# Patient Record
Sex: FEMALE | Race: OTHER | Employment: FULL TIME | URBAN - METROPOLITAN AREA
[De-identification: names, ages, dates, MRNs, and addresses within clinical notes are randomized per-mention and may not be internally consistent; named-entity substitution may affect disease eponyms.]

---

## 2017-10-02 ENCOUNTER — ANESTHESIA EVENT (OUTPATIENT)
Dept: ENDOSCOPY | Facility: HOSPITAL | Age: 37
End: 2017-10-02
Payer: COMMERCIAL

## 2017-10-02 ENCOUNTER — SURGERY (OUTPATIENT)
Age: 37
End: 2017-10-02

## 2017-10-02 ENCOUNTER — HOSPITAL ENCOUNTER (OUTPATIENT)
Facility: HOSPITAL | Age: 37
Setting detail: HOSPITAL OUTPATIENT SURGERY
Discharge: HOME | End: 2017-10-02
Attending: INTERNAL MEDICINE | Admitting: INTERNAL MEDICINE
Payer: COMMERCIAL

## 2017-10-02 ENCOUNTER — ANESTHESIA (OUTPATIENT)
Dept: ENDOSCOPY | Facility: HOSPITAL | Age: 37
End: 2017-10-02
Payer: COMMERCIAL

## 2017-10-02 VITALS
OXYGEN SATURATION: 94 % | HEIGHT: 65 IN | BODY MASS INDEX: 48.82 KG/M2 | TEMPERATURE: 98 F | DIASTOLIC BLOOD PRESSURE: 79 MMHG | RESPIRATION RATE: 16 BRPM | HEART RATE: 80 BPM | SYSTOLIC BLOOD PRESSURE: 118 MMHG | WEIGHT: 293 LBS

## 2017-10-02 DIAGNOSIS — Z01.818 PREOP EXAMINATION: ICD-10-CM

## 2017-10-02 DIAGNOSIS — D64.9 ANEMIA, UNSPECIFIED TYPE: ICD-10-CM

## 2017-10-02 DIAGNOSIS — E66.09 OBESITY DUE TO EXCESS CALORIES WITHOUT SERIOUS COMORBIDITY WITH BODY MASS INDEX (BMI) IN 98TH TO 99TH PERCENTILE FOR AGE IN PEDIATRIC PATIENT: ICD-10-CM

## 2017-10-02 PROCEDURE — 88305 TISSUE EXAM BY PATHOLOGIST: CPT | Performed by: INTERNAL MEDICINE

## 2017-10-02 PROCEDURE — 81025 URINE PREGNANCY TEST: CPT | Performed by: INTERNAL MEDICINE

## 2017-10-02 PROCEDURE — 0DB68ZX EXCISION OF STOMACH, VIA NATURAL OR ARTIFICIAL OPENING ENDOSCOPIC, DIAGNOSTIC: ICD-10-PCS | Performed by: INTERNAL MEDICINE

## 2017-10-02 PROCEDURE — 0DBN8ZX EXCISION OF SIGMOID COLON, VIA NATURAL OR ARTIFICIAL OPENING ENDOSCOPIC, DIAGNOSTIC: ICD-10-PCS | Performed by: INTERNAL MEDICINE

## 2017-10-02 PROCEDURE — 0DB98ZX EXCISION OF DUODENUM, VIA NATURAL OR ARTIFICIAL OPENING ENDOSCOPIC, DIAGNOSTIC: ICD-10-PCS | Performed by: INTERNAL MEDICINE

## 2017-10-02 RX ORDER — SODIUM CHLORIDE, SODIUM LACTATE, POTASSIUM CHLORIDE, CALCIUM CHLORIDE 600; 310; 30; 20 MG/100ML; MG/100ML; MG/100ML; MG/100ML
INJECTION, SOLUTION INTRAVENOUS CONTINUOUS
Status: DISCONTINUED | OUTPATIENT
Start: 2017-10-02 | End: 2017-10-02

## 2017-10-02 NOTE — ANESTHESIA POSTPROCEDURE EVALUATION
[facility] Patient Status:  Hospital Outpatient Surgery   Age/Gender 40year old female MRN [id number]   Location [address] [place] [address]. Attending Wm Parra MD   Hosp Day # 0 PCP Baltazar Garcia MD       Anesthesia Post-op

## 2017-10-02 NOTE — OPERATIVE REPORT
PATIENT NAME: Josette Parekh  MRN: [id number]  DATE OF OPERATION: 10/2/2017  PREOPERATIVE DIAGNOSIS:   1. Iron deficiency anemia  2. pre op evaluation for gastric bypass for morbid obesity  POSTOPERATIVE DIAGNOSES:  1. Normal upper endoscopy  2.  Hemorrhoids from the duodenum, and stomach.          The patient was then rotated 180 degrees, and a digital rectal exam was performed which revealed no obvious perianal disease or palpable masses within the anal canal. The lubricated tip of an Olympus video colonoscop

## 2017-10-02 NOTE — ANESTHESIA PREPROCEDURE EVALUATION
PRE-OP EVALUATION    Patient Name: Ross Cardenas    Pre-op Diagnosis: Preop examination [Z01.818]  Anemia, unspecified type [D64.9]  Obesity due to excess calories without serious comorbidity with body mass index (BMI) in 98th to 99th percentile for age i notable dental history. Pulmonary                     Other findings            ASA: 2   Plan: MAC  NPO status verified and     Post-procedure pain management plan discussed with surgeon and patient.       Plan/risks discussed with: patient

## 2017-10-05 NOTE — PROGRESS NOTES
10/5/2017  [address]    Dear Alejandra Manley,       Here are the biopsy/pathology findings from your recent EGD (upper endoscopy) and colonoscopy: The biopsy/pathology findings from your upper endoscopy showed:  1.   Small b

## 2017-11-30 PROCEDURE — 88175 CYTOPATH C/V AUTO FLUID REDO: CPT | Performed by: OBSTETRICS & GYNECOLOGY

## 2017-11-30 PROCEDURE — 87624 HPV HI-RISK TYP POOLED RSLT: CPT | Performed by: OBSTETRICS & GYNECOLOGY

## 2018-12-03 PROCEDURE — 88175 CYTOPATH C/V AUTO FLUID REDO: CPT | Performed by: OBSTETRICS & GYNECOLOGY

## 2019-09-12 PROBLEM — R10.32 LEFT LOWER QUADRANT ABDOMINAL PAIN: Status: ACTIVE | Noted: 2019-09-12

## 2019-09-12 PROBLEM — K52.9 COLITIS: Status: ACTIVE | Noted: 2019-09-12

## 2019-09-12 PROBLEM — R93.3 ABNORMAL FINDING ON GI TRACT IMAGING: Status: ACTIVE | Noted: 2019-09-12

## 2019-12-04 PROBLEM — N63.15 BREAST LUMP ON RIGHT SIDE AT 3 O'CLOCK POSITION: Status: ACTIVE | Noted: 2019-12-04

## 2019-12-04 PROBLEM — N63.15 BREAST LUMP ON RIGHT SIDE AT 9 O'CLOCK POSITION: Status: ACTIVE | Noted: 2019-12-04

## 2021-11-09 ENCOUNTER — LAB ENCOUNTER (OUTPATIENT)
Dept: LAB | Age: 41
End: 2021-11-09
Attending: INTERNAL MEDICINE
Payer: COMMERCIAL

## 2021-11-09 DIAGNOSIS — Z01.818 PRE-OP TESTING: ICD-10-CM

## 2021-11-12 ENCOUNTER — ANESTHESIA (OUTPATIENT)
Dept: ENDOSCOPY | Facility: HOSPITAL | Age: 41
End: 2021-11-12
Payer: COMMERCIAL

## 2021-11-12 ENCOUNTER — HOSPITAL ENCOUNTER (OUTPATIENT)
Facility: HOSPITAL | Age: 41
Setting detail: HOSPITAL OUTPATIENT SURGERY
Discharge: HOME | End: 2021-11-12
Attending: INTERNAL MEDICINE | Admitting: INTERNAL MEDICINE
Payer: COMMERCIAL

## 2021-11-12 ENCOUNTER — ANESTHESIA EVENT (OUTPATIENT)
Dept: ENDOSCOPY | Facility: HOSPITAL | Age: 41
End: 2021-11-12
Payer: COMMERCIAL

## 2021-11-12 VITALS
TEMPERATURE: 98 F | RESPIRATION RATE: 16 BRPM | DIASTOLIC BLOOD PRESSURE: 57 MMHG | SYSTOLIC BLOOD PRESSURE: 114 MMHG | WEIGHT: 293 LBS | HEIGHT: 65 IN | HEART RATE: 66 BPM | OXYGEN SATURATION: 99 % | BODY MASS INDEX: 48.82 KG/M2

## 2021-11-12 DIAGNOSIS — D50.9 MICROCYTIC ANEMIA: ICD-10-CM

## 2021-11-12 DIAGNOSIS — K59.04 CHRONIC IDIOPATHIC CONSTIPATION: ICD-10-CM

## 2021-11-12 DIAGNOSIS — Z01.818 PRE-OP TESTING: Primary | ICD-10-CM

## 2021-11-12 PROCEDURE — 81025 URINE PREGNANCY TEST: CPT

## 2021-11-12 PROCEDURE — 0DBA8ZX EXCISION OF JEJUNUM, VIA NATURAL OR ARTIFICIAL OPENING ENDOSCOPIC, DIAGNOSTIC: ICD-10-PCS | Performed by: INTERNAL MEDICINE

## 2021-11-12 PROCEDURE — 0DBN8ZX EXCISION OF SIGMOID COLON, VIA NATURAL OR ARTIFICIAL OPENING ENDOSCOPIC, DIAGNOSTIC: ICD-10-PCS | Performed by: INTERNAL MEDICINE

## 2021-11-12 PROCEDURE — 0DB68ZX EXCISION OF STOMACH, VIA NATURAL OR ARTIFICIAL OPENING ENDOSCOPIC, DIAGNOSTIC: ICD-10-PCS | Performed by: INTERNAL MEDICINE

## 2021-11-12 PROCEDURE — 0DBL8ZX EXCISION OF TRANSVERSE COLON, VIA NATURAL OR ARTIFICIAL OPENING ENDOSCOPIC, DIAGNOSTIC: ICD-10-PCS | Performed by: INTERNAL MEDICINE

## 2021-11-12 PROCEDURE — 88305 TISSUE EXAM BY PATHOLOGIST: CPT | Performed by: INTERNAL MEDICINE

## 2021-11-12 PROCEDURE — 88312 SPECIAL STAINS GROUP 1: CPT | Performed by: INTERNAL MEDICINE

## 2021-11-12 RX ORDER — LIDOCAINE HYDROCHLORIDE 10 MG/ML
INJECTION, SOLUTION EPIDURAL; INFILTRATION; INTRACAUDAL; PERINEURAL AS NEEDED
Status: DISCONTINUED | OUTPATIENT
Start: 2021-11-12 | End: 2021-11-12 | Stop reason: SURG

## 2021-11-12 RX ORDER — SODIUM CHLORIDE, SODIUM LACTATE, POTASSIUM CHLORIDE, CALCIUM CHLORIDE 600; 310; 30; 20 MG/100ML; MG/100ML; MG/100ML; MG/100ML
INJECTION, SOLUTION INTRAVENOUS CONTINUOUS
Status: DISCONTINUED | OUTPATIENT
Start: 2021-11-12 | End: 2021-11-12

## 2021-11-12 RX ADMIN — SODIUM CHLORIDE, SODIUM LACTATE, POTASSIUM CHLORIDE, CALCIUM CHLORIDE: 600; 310; 30; 20 INJECTION, SOLUTION INTRAVENOUS at 12:50:00

## 2021-11-12 RX ADMIN — LIDOCAINE HYDROCHLORIDE 50 MG: 10 INJECTION, SOLUTION EPIDURAL; INFILTRATION; INTRACAUDAL; PERINEURAL at 12:54:00

## 2021-11-12 NOTE — OPERATIVE REPORT
EGD/Colonoscopy Operative Report    [address] Patient Status:  Hospital Outpatient Surgery    1980 MRN [id number]   Location [facility] ENDOSCOPY LAB SUITES Attending Mikki Kovacs MD from the patient. The patient tolerated the procedure well with no immediate complications. The patient was then repositioned for colonoscopy.     After careful digital rectal examination, the Adult colonoscope was inserted into the rectum and advanced to t

## 2021-11-12 NOTE — ANESTHESIA POSTPROCEDURE EVALUATION
Patient: Rakel Interiano    Procedure Summary     Date: 11/12/21 Room / Location: [address] [place] [address] ENDOSCOPY 04 / [address] [place] [address] ENDOSCOPY    Anesthesia Start: 6776 Anesthesia Stop:     Procedures:       COLONOSCOPY (N/A )      ESOPHAGOGASTRODUODENOSCOPY (EGD) (N/A ) Jeff Gil

## 2021-11-12 NOTE — H&P
HISTORY AND PHYSICAL FOR ENDOSCOPIC PROCEDURE      [address] Patient Status:  Hospital Outpatient Surgery    1980 MRN [id number]   Location [facility] ENDOSCOPY PREP AND RECOVERY Attending Lulu Crump MD   Hosp Day # 0 PCP Ej Fisher HPI  Allergy: negative other than specified in the HPI  ENT: negative other than specified in the HPI  Physical Exam:    Last menstrual period 01/26/2021, not currently breastfeeding. General: Appears alert, oriented x3 and in no acute distress.   HEENT:

## 2021-11-12 NOTE — DISCHARGE SUMMARY
ENDOSCOPY DISCHARGE INSTRUCTIONS    Procedure Performed:   Gastroscopy and Colonoscopy    Endoscopist: No name on file. FINDINGS:   Normal appearing esophagus, stomach with bypass and small bowel.       There was 1 polyp in the colon which was removed and

## 2021-11-12 NOTE — ANESTHESIA PREPROCEDURE EVALUATION
Anesthesia PreOp Note    HPI:     Tamara Aquino is a 39year old female who presents for preoperative consultation requested by: Bharati Dietrich MD    Date of Surgery: 11/12/2021    Procedure(s):  COLONOSCOPY  ESOPHAGOGASTRODUODENOSCOPY (EGD)  Indicat 3, 10/31/2021  IRON-VITAMIN C OR, Take by mouth., Disp: , Rfl: , 11/9/2021  Biotin 5000 MCG Oral Cap, Take by mouth., Disp: , Rfl: , 11/9/2021  Cyanocobalamin (B-12) 1500 MCG Oral Tab CR, Take by mouth daily.   , Disp: , Rfl: , 11/9/2021  MULTIVITAMIN TAB/C file  Food Insecurity: Not on file  Transportation Needs: Not on file  Physical Activity: Not on file  Stress: Not on file  Social Connections: Not on file  Intimate Partner Violence: Not on file  Housing Stability: Not on file    Available pre-op labs rev were answered to the best of my ability. The patient desires the anesthetic management as planned.   Galilea Alvarez CRNA  11/12/2021 12:07 PM

## 2021-11-23 NOTE — PROGRESS NOTES
Patient contacted and results discussed. Had 1 TA and needs repeat Colonoscopy in 7 years. Unclear cause of the segmental colitis however suspect constipation and stool burden as etiology.   Doing well on Miralax daily and she can continue on this regimen

## 2022-01-16 ENCOUNTER — HOSPITAL ENCOUNTER (EMERGENCY)
Facility: HOSPITAL | Age: 42
Discharge: HOME | End: 2022-01-16
Attending: EMERGENCY MEDICINE
Payer: COMMERCIAL

## 2022-01-16 ENCOUNTER — APPOINTMENT (OUTPATIENT)
Dept: CT IMAGING | Facility: HOSPITAL | Age: 42
End: 2022-01-16
Attending: EMERGENCY MEDICINE
Payer: COMMERCIAL

## 2022-01-16 VITALS
OXYGEN SATURATION: 99 % | SYSTOLIC BLOOD PRESSURE: 105 MMHG | TEMPERATURE: 98 F | HEART RATE: 89 BPM | RESPIRATION RATE: 20 BRPM | DIASTOLIC BLOOD PRESSURE: 70 MMHG | WEIGHT: 290 LBS | BODY MASS INDEX: 48 KG/M2

## 2022-01-16 DIAGNOSIS — K52.9 COLITIS: Primary | ICD-10-CM

## 2022-01-16 LAB
ANION GAP SERPL CALC-SCNC: 7 MMOL/L (ref 0–18)
B-HCG UR QL: NEGATIVE
BASOPHILS # BLD AUTO: 0.02 X10(3) UL (ref 0–0.2)
BASOPHILS NFR BLD AUTO: 0.3 %
BILIRUB UR QL CFM: POSITIVE
BUN BLD-MCNC: 7 MG/DL (ref 7–18)
BUN/CREAT SERPL: 9.6 (ref 10–20)
CALCIUM BLD-MCNC: 9 MG/DL (ref 8.5–10.1)
CHLORIDE SERPL-SCNC: 104 MMOL/L (ref 98–112)
CO2 SERPL-SCNC: 25 MMOL/L (ref 21–32)
COLOR UR: YELLOW
CREAT BLD-MCNC: 0.73 MG/DL
DEPRECATED RDW RBC AUTO: 44.3 FL (ref 35.1–46.3)
EOSINOPHIL # BLD AUTO: 0.04 X10(3) UL (ref 0–0.7)
EOSINOPHIL NFR BLD AUTO: 0.5 %
ERYTHROCYTE [DISTWIDTH] IN BLOOD BY AUTOMATED COUNT: 16.3 % (ref 11–15)
GLUCOSE BLD-MCNC: 116 MG/DL (ref 70–99)
GLUCOSE UR-MCNC: NEGATIVE MG/DL
HCT VFR BLD AUTO: 34.7 %
HGB BLD-MCNC: 10.5 G/DL
HGB UR QL STRIP.AUTO: NEGATIVE
IMM GRANULOCYTES # BLD AUTO: 0.02 X10(3) UL (ref 0–1)
IMM GRANULOCYTES NFR BLD: 0.3 %
KETONES UR-MCNC: 20 MG/DL
LEUKOCYTE ESTERASE UR QL STRIP.AUTO: NEGATIVE
LYMPHOCYTES # BLD AUTO: 1.02 X10(3) UL (ref 1–4)
LYMPHOCYTES NFR BLD AUTO: 13 %
MCH RBC QN AUTO: 23.2 PG (ref 26–34)
MCHC RBC AUTO-ENTMCNC: 30.3 G/DL (ref 31–37)
MCV RBC AUTO: 76.8 FL
MONOCYTES # BLD AUTO: 0.88 X10(3) UL (ref 0.1–1)
MONOCYTES NFR BLD AUTO: 11.3 %
NEUTROPHILS # BLD AUTO: 5.84 X10 (3) UL (ref 1.5–7.7)
NEUTROPHILS # BLD AUTO: 5.84 X10(3) UL (ref 1.5–7.7)
NEUTROPHILS NFR BLD AUTO: 74.6 %
NITRITE UR QL STRIP.AUTO: NEGATIVE
OSMOLALITY SERPL CALC.SUM OF ELEC: 281 MOSM/KG (ref 275–295)
PH UR: 5 [PH] (ref 5–8)
PLATELET # BLD AUTO: 383 10(3)UL (ref 150–450)
POTASSIUM SERPL-SCNC: 3.5 MMOL/L (ref 3.5–5.1)
PROT UR-MCNC: 100 MG/DL
RBC # BLD AUTO: 4.52 X10(6)UL
SODIUM SERPL-SCNC: 136 MMOL/L (ref 136–145)
SP GR UR STRIP: 1.03 (ref 1–1.03)
UROBILINOGEN UR STRIP-ACNC: 4
WBC # BLD AUTO: 7.8 X10(3) UL (ref 4–11)

## 2022-01-16 PROCEDURE — 74176 CT ABD & PELVIS W/O CONTRAST: CPT | Performed by: EMERGENCY MEDICINE

## 2022-01-16 PROCEDURE — 80048 BASIC METABOLIC PNL TOTAL CA: CPT | Performed by: EMERGENCY MEDICINE

## 2022-01-16 PROCEDURE — 81001 URINALYSIS AUTO W/SCOPE: CPT | Performed by: EMERGENCY MEDICINE

## 2022-01-16 PROCEDURE — 96374 THER/PROPH/DIAG INJ IV PUSH: CPT

## 2022-01-16 PROCEDURE — 85025 COMPLETE CBC W/AUTO DIFF WBC: CPT | Performed by: EMERGENCY MEDICINE

## 2022-01-16 PROCEDURE — 99284 EMERGENCY DEPT VISIT MOD MDM: CPT

## 2022-01-16 PROCEDURE — S0028 INJECTION, FAMOTIDINE, 20 MG: HCPCS | Performed by: EMERGENCY MEDICINE

## 2022-01-16 PROCEDURE — 81025 URINE PREGNANCY TEST: CPT

## 2022-01-16 PROCEDURE — 96361 HYDRATE IV INFUSION ADD-ON: CPT

## 2022-01-16 PROCEDURE — 96375 TX/PRO/DX INJ NEW DRUG ADDON: CPT

## 2022-01-16 RX ORDER — ACETAMINOPHEN AND CODEINE PHOSPHATE 300; 30 MG/1; MG/1
1 TABLET ORAL EVERY 6 HOURS PRN
Qty: 15 TABLET | Refills: 0 | Status: SHIPPED | OUTPATIENT
Start: 2022-01-16 | End: 2022-01-21

## 2022-01-16 RX ORDER — FAMOTIDINE 10 MG/ML
20 INJECTION, SOLUTION INTRAVENOUS ONCE
Status: COMPLETED | OUTPATIENT
Start: 2022-01-16 | End: 2022-01-16

## 2022-01-16 RX ORDER — SACCHAROMYCES BOULARDII 250 MG
250 CAPSULE ORAL 2 TIMES DAILY
Qty: 28 CAPSULE | Refills: 0 | Status: SHIPPED | OUTPATIENT
Start: 2022-01-16 | End: 2022-01-30

## 2022-01-16 RX ORDER — MORPHINE SULFATE 2 MG/ML
2 INJECTION, SOLUTION INTRAMUSCULAR; INTRAVENOUS ONCE
Status: COMPLETED | OUTPATIENT
Start: 2022-01-16 | End: 2022-01-16

## 2022-01-16 NOTE — ED PROVIDER NOTES
Patient Seen in: [facility] AND [facility] Emergency Department    History   Patient presents with:  Constipation    Stated Complaint: LLQ pain    HPI    Patient complains of l sided  abdominal pain that began 3 days ago. Pain described as sharp, crampy.   Pain Family History   Problem Relation Age of Onset   • Diabetes Father    • Diabetes Mother    • Cancer Neg    • Heart Disorder Neg        Social History    Tobacco Use      Smoking status: Never Smoker      Smokeless tobacco: Never Used    Vaping Use      V components:       Result Value    Glucose 116 (*)     BUN/CREA Ratio 9.6 (*)     All other components within normal limits   URINALYSIS WITH CULTURE REFLEX - Abnormal; Notable for the following components:    Clarity Urine Hazy (*)     Ketones Urine 20  Helayne Shark Bladder wall thickening, greater than what is expected for under distention can be seen with cystitis. Correlation with urinalysis suggested. No renal calculus or hydronephrosis.   Semi solid and ground-glass nodules are seen within the bilateral lower lob

## 2022-02-28 ENCOUNTER — LAB ENCOUNTER (OUTPATIENT)
Dept: LAB | Age: 42
End: 2022-02-28
Attending: INTERNAL MEDICINE
Payer: COMMERCIAL

## 2022-02-28 DIAGNOSIS — Z01.818 PRE-OP TESTING: ICD-10-CM

## 2022-03-01 LAB — SARS-COV-2 RNA RESP QL NAA+PROBE: NOT DETECTED

## 2022-03-03 ENCOUNTER — HOSPITAL ENCOUNTER (OUTPATIENT)
Facility: HOSPITAL | Age: 42
Setting detail: HOSPITAL OUTPATIENT SURGERY
Discharge: HOME | End: 2022-03-03
Attending: INTERNAL MEDICINE | Admitting: INTERNAL MEDICINE
Payer: COMMERCIAL

## 2022-03-03 VITALS
HEIGHT: 64 IN | RESPIRATION RATE: 20 BRPM | OXYGEN SATURATION: 99 % | SYSTOLIC BLOOD PRESSURE: 109 MMHG | BODY MASS INDEX: 42.68 KG/M2 | DIASTOLIC BLOOD PRESSURE: 61 MMHG | HEART RATE: 73 BPM | WEIGHT: 250 LBS | TEMPERATURE: 98 F

## 2022-03-03 DIAGNOSIS — Z01.818 PRE-OP TESTING: Primary | ICD-10-CM

## 2022-03-03 DIAGNOSIS — K52.9 COLITIS: ICD-10-CM

## 2022-03-03 LAB — B-HCG UR QL: NEGATIVE

## 2022-03-03 PROCEDURE — 99152 MOD SED SAME PHYS/QHP 5/>YRS: CPT | Performed by: INTERNAL MEDICINE

## 2022-03-03 PROCEDURE — 0DBP8ZX EXCISION OF RECTUM, VIA NATURAL OR ARTIFICIAL OPENING ENDOSCOPIC, DIAGNOSTIC: ICD-10-PCS | Performed by: INTERNAL MEDICINE

## 2022-03-03 PROCEDURE — 81025 URINE PREGNANCY TEST: CPT

## 2022-03-03 PROCEDURE — 0DBN8ZX EXCISION OF SIGMOID COLON, VIA NATURAL OR ARTIFICIAL OPENING ENDOSCOPIC, DIAGNOSTIC: ICD-10-PCS | Performed by: INTERNAL MEDICINE

## 2022-03-03 PROCEDURE — 88305 TISSUE EXAM BY PATHOLOGIST: CPT | Performed by: INTERNAL MEDICINE

## 2022-03-03 RX ORDER — MIDAZOLAM HYDROCHLORIDE 1 MG/ML
INJECTION INTRAMUSCULAR; INTRAVENOUS
Status: DISCONTINUED | OUTPATIENT
Start: 2022-03-03 | End: 2022-03-03

## 2022-03-03 RX ORDER — SODIUM CHLORIDE 0.9 % (FLUSH) 0.9 %
10 SYRINGE (ML) INJECTION AS NEEDED
Status: DISCONTINUED | OUTPATIENT
Start: 2022-03-03 | End: 2022-03-03

## 2022-03-03 RX ORDER — SODIUM CHLORIDE, SODIUM LACTATE, POTASSIUM CHLORIDE, CALCIUM CHLORIDE 600; 310; 30; 20 MG/100ML; MG/100ML; MG/100ML; MG/100ML
INJECTION, SOLUTION INTRAVENOUS CONTINUOUS
Status: DISCONTINUED | OUTPATIENT
Start: 2022-03-03 | End: 2022-03-03

## 2022-03-03 RX ORDER — MIDAZOLAM HYDROCHLORIDE 1 MG/ML
1 INJECTION INTRAMUSCULAR; INTRAVENOUS EVERY 5 MIN PRN
Status: DISCONTINUED | OUTPATIENT
Start: 2022-03-03 | End: 2022-03-03

## 2022-03-03 NOTE — DISCHARGE SUMMARY
ENDOSCOPY DISCHARGE INSTRUCTIONS    Procedure Performed:   Flexible Sigmoidoscopy    Endoscopist: No name on file. FINDINGS:   MILD colitis (inflammation of the colon) in the similar location as prior Colonoscopy. MEDICATIONS:  You may resume all other medications today    DIET:  Resume Normal Diet    BIOPSIES:  Biopsies were taken (you will be notified of results in 7-10 days)    X-RAYS/LABS:   No X-rays/Labs were ordered today    ADDITIONAL RECOMMENDATIONS:    - Follow up pathology results  - Continue Sulfasalazine   - Continue Budesonide with taper  - Continue Prilosec     Activity for remainder of today:    REST TODAY  DO NOT drive or operate heavy machinery  DO NOT drink any alcoholic beverages  DO NOT sign any legal documents or make any important decisions    After your procedure(s): It is not unusual to feel bloated or gassy . Passing gas and belching is encouraged. Lying on your left side with your knees flexed may relieve the discomfort. A hot pack to the abdomen may also help.     FOLLOW-UP:  Contact the office at [phone] for follow-up appointment is needed or if you develop any of the following:    Severe abdominal pain/discomfort     Excessive bleeding                     Black tarry stool    Difficulty breathing/swallowing      Persistent nausea/vomiting  Fever above 100 degrees or chills

## 2022-03-03 NOTE — H&P
The H&P dated 2/3/22 was reviewed by Real Villalobos MD today, the patient was examined and no significant changes have occurred in the patient's condition since the H&P was performed. I discussed with the patient and/or legal representative the potential benefits, risks and side effects of this procedure; the likelihood of the patient achieving goals; and potential problems that might occur during recuperation. I discussed reasonable alternatives to the procedure, including risks, benefits and side effects related to the alternatives and risks related to not receiving this procedure. We will proceed with procedure as planned. Informed consent was obtained for flexible sigmoidoscopy with possible biopsy, dilation, polypectomy, therapy, banding and control of bleeding after explanation of risks, benefits and alternatives to the procedure. Risks include but not limited to bleeding, infection, perforation, missed polyps or cancer and risks of sedation.

## 2022-03-03 NOTE — OPERATIVE REPORT
Sigmoidoscopy Operative Report    [address] Patient Status:  Hospital Outpatient Surgery    1980 MRN [id number]   Location [facility] ENDOSCOPY LAB SUITES Attending Ginger Pickett MD   Hosp Day #   0 PCP Franci Alcantara MD     Pre-Operative Diagnosis: Colitis    Post-Operative Diagnosis:  Mild sigmoid colitis  Grade 1 Internal Hemorrhoids      Procedure Performed: SIGMOIDOSCOPY w/Biopsy    Informed Consent: Informed consent for both the procedure and sedation were obtained from the patient. The potentially life-threatening complications of sedation, bleeding,  perforation, transfusion or repeat endoscopy  were reviewed along with the possible need for hospitalization, surgical management, transfusion or repeat endoscopy should one of these complications arise. The patient understands and is agreeable to proceed. Sedation Type: Conscious Sedation- 9 mg of Versed, 100 mcg of Fentanyl given in divided doses as per protocol  Moderate Sedation Time: 10  A trained sedation nurse was present to assist in monitoring the patient during the entire length of moderate sedation time. Cecum Withdrawal Time:  NA  Date of previous colonoscopy: 2021    Procedure Description: The patient was placed in the left lateral decubitus position. After careful digital rectal examination, the Pediatric colonoscope was inserted into the rectum and advanced to the level of the descending colon under direct visualization. Careful examination of the colon was performed during withdrawal of the endoscope. The scope was withdrawn to the rectum and retroflexion was performed. The patient tolerated the procedure well with no immediate complications. The patient was transferred to the recovery area in stable condition. Quality of Preparation: Adequate    Findings:   Colon:    - Normal appearing distal transverse, descending and distal sigmoid colon.   - Similar area of mild patchy edema and erythema in the mid-sigmoid colon which did not appear different from prior Colonoscopy in 11/2021. Did not noticed any diverticulosis to suggest SCAD. Rectum: Grade 2 internal hemorrhoids seen on retroflexion. Normal manual rectal exam.      Recommendations:   - Follow up pathology results  - Continue Sulfasalazine   - Continue Budesonide with taper  - Continue Prilosec     Discharge: The patient was given an after visit summary detailing the procedure, findings, recommendations and follow up plans.      Lisa Dalal MD  3/3/2022  9:35 AM

## 2022-03-09 NOTE — PROGRESS NOTES
Patient contacted and pathology discussed. She is feeling better more and more now. Will continue on current regimen. 3/9/2022  [address]  [address] [facility] [address]    Dear Olga Walsh,       Here are the biopsy/pathology findings from your recent Colonoscopy : There was mild colitis - an inflammation of the large intestine (colon). Follow-up information:    - Decrease Budesonide 6 mg after 1 week to 3 mg daily for 2 weeks    - Continue the Sulfasalazine.    - Follow up in 3 months      If you need any further assistance, please feel free to call [phone]. Thank you for letting us care for you.       Alvira Alpers, MD [facility]  [phone]

## 2024-01-26 ENCOUNTER — HOSPITAL ENCOUNTER (OUTPATIENT)
Age: 44
Discharge: HOME | End: 2024-01-26
Payer: COMMERCIAL

## 2024-01-26 VITALS
HEART RATE: 74 BPM | TEMPERATURE: 97 F | SYSTOLIC BLOOD PRESSURE: 137 MMHG | DIASTOLIC BLOOD PRESSURE: 72 MMHG | RESPIRATION RATE: 20 BRPM | OXYGEN SATURATION: 97 %

## 2024-01-26 DIAGNOSIS — J06.9 UPPER RESPIRATORY TRACT INFECTION, UNSPECIFIED TYPE: Primary | ICD-10-CM

## 2024-01-26 LAB
POCT INFLUENZA A: NEGATIVE
POCT INFLUENZA B: NEGATIVE
SARS-COV-2 RNA RESP QL NAA+PROBE: NOT DETECTED

## 2024-01-26 RX ORDER — ECHINACEA PURPUREA EXTRACT 125 MG
1 TABLET ORAL EVERY 4 HOURS PRN
Qty: 30 ML | Refills: 0 | Status: SHIPPED | OUTPATIENT
Start: 2024-01-26

## 2024-01-26 RX ORDER — BENZONATATE 100 MG/1
100 CAPSULE ORAL 3 TIMES DAILY PRN
Qty: 30 CAPSULE | Refills: 0 | Status: SHIPPED | OUTPATIENT
Start: 2024-01-26

## 2024-01-26 NOTE — ED PROVIDER NOTES
Chief Complaint   Patient presents with    Sore Throat       HPI:     Rossy Courtney is a 43 year old female who presents for evaluation of sore throat nasal congestion and periodic cough over the last 7 days.  Notes returned this past weekend from a  with family members testing positive this week for coronavirus.  Notes outpatient screening 3 days ago negative, yesterday positive requesting confirmation.  Notes previous history of coronavirus last year without Paxlovid or complications including hospitalization.  Taking periodic NyQuil with mild improvement in symptoms.  Notes mild frontal headache, 2 out of 10, not worst of life.  Denies associated dizziness ear pain dysphagia neck pain chest pain shortness of breath abdominal pain vomiting diarrhea dysuria or rash.      PFSH    PFSH asessment screens reviewed and agree.  Nurses notes reviewed I agree with documentation.    Family History   Problem Relation Age of Onset    Diabetes Father     Diabetes Mother     Cancer Neg     Heart Disorder Neg      Family history reviewed with patient/caregiver and is not pertinent to presenting problem.  Social History     Socioeconomic History    Marital status:      Spouse name: Not on file    Number of children: Not on file    Years of education: Not on file    Highest education level: Not on file   Occupational History    Not on file   Tobacco Use    Smoking status: Never    Smokeless tobacco: Never   Vaping Use    Vaping Use: Never used   Substance and Sexual Activity    Alcohol use: Yes     Alcohol/week: 0.0 standard drinks of alcohol     Comment: social    Drug use: No    Sexual activity: Yes     Birth control/protection: Inserts   Other Topics Concern    Not on file   Social History Narrative    Not on file     Social Determinants of Health     Financial Resource Strain: Not on file   Food Insecurity: Not on file   Transportation Needs: Not on file   Physical Activity: Not on file   Stress: Not on  file   Social Connections: Not on file   Housing Stability: Not on file         ROS:   Positive for stated complaint: Sore throat, congestion, cough.  All other systems reviewed and negative except as noted above.  Constitutional and Vital Signs Reviewed.      Physical Exam:     Findings:    /72   Pulse 74   Temp 97.2 °F (36.2 °C) (Temporal)   Resp 20   SpO2 97%   GENERAL: well developed, well nourished, well hydrated, no distress  SKIN: good skin turgor, no obvious rashes  NECK: No nuchal rigidity.  Supple, no adenopathy  EXTREMITIES: no cyanosis or edema. CERVANTES without difficulty  GI: soft, non-tender, normal bowel sounds  HEAD: normocephalic, atraumatic  EYES: sclera non icteric bilateral, conjunctiva clear  EARS: TMs clear bilaterally. Canals clear.  NOSE: nasal turbinates: pink, normal mucosa  THROAT: No erythema posterior pharynx, nonenlarged tonsils.  Without exudates, uvula midline, and airway patent  LUNGS: No retractions.  Clear to auscultation bilaterally; no rales, rhonchi, or wheezes  NEURO: No focal deficits  PSYCH: Alert and oriented x3.  Answering questions appropriately.  Mood appropriate.    MDM/Assessment/Plan:   Orders for this encounter:    Orders Placed This Encounter    Rapid SARS-CoV-2 by PCR     Order Specific Question:   Release to patient     Answer:   Immediate    POCT Flu Test     Order Specific Question:   Release to patient     Answer:   Immediate    benzonatate 100 MG Oral Cap     Sig: Take 1 capsule (100 mg total) by mouth 3 (three) times daily as needed for cough.     Dispense:  30 capsule     Refill:  0    sodium chloride (OCEAN NASAL SPRAY) 0.65 % Nasal Solution     Si spray by Nasal route every 4 (four) hours as needed for congestion.     Dispense:  30 mL     Refill:  0       Labs performed this visit:  Recent Results (from the past 10 hour(s))   Rapid SARS-CoV-2 by PCR    Collection Time: 24 10:26 AM    Specimen: Nares; Other   Result Value Ref Range    Rapid  SARS-CoV-2 by PCR Not Detected Not Detected   POCT Flu Test    Collection Time: 01/26/24 11:05 AM    Specimen: Nares; Other   Result Value Ref Range    POCT INFLUENZA A Negative Negative    POCT INFLUENZA B Negative Negative       MDM:  Patient swabs negative.  Instructed for precautions over the next few days based on evaluation history and outpatient coronavirus screen positive from home potentially false positive.  Agrees to supportive measures versus antibiotics based on exam and history most reflective of viral URI.  Educated home care follow-up as well as indications return versus go to the ER.  Happy with plan.    Diagnosis:    ICD-10-CM    1. Upper respiratory tract infection, unspecified type  J06.9           All results reviewed and discussed with patient.  See AVS for detailed discharge instructions for your condition today.    Follow Up with:  Pavithra Leon MD  [address] [facility] [address] [phone]    Schedule an appointment as soon as possible for a visit in 3 days  As needed, If symptoms worsen

## 2024-01-26 NOTE — ED INITIAL ASSESSMENT (HPI)
Pt with low energy, congestion, cough, and sore throat since Monday. States recently with family members that tested positive for covid

## 2024-10-11 ENCOUNTER — HOSPITAL ENCOUNTER (EMERGENCY)
Facility: HOSPITAL | Age: 44
Discharge: HOME | End: 2024-10-12
Attending: EMERGENCY MEDICINE
Payer: COMMERCIAL

## 2024-10-11 ENCOUNTER — APPOINTMENT (OUTPATIENT)
Dept: GENERAL RADIOLOGY | Facility: HOSPITAL | Age: 44
End: 2024-10-11
Payer: COMMERCIAL

## 2024-10-11 DIAGNOSIS — S52.572A OTHER CLOSED INTRA-ARTICULAR FRACTURE OF DISTAL END OF LEFT RADIUS, INITIAL ENCOUNTER: Primary | ICD-10-CM

## 2024-10-11 DIAGNOSIS — M25.532 LEFT WRIST PAIN: ICD-10-CM

## 2024-10-11 PROCEDURE — 99284 EMERGENCY DEPT VISIT MOD MDM: CPT

## 2024-10-11 PROCEDURE — 73110 X-RAY EXAM OF WRIST: CPT

## 2024-10-11 RX ORDER — HYDROCODONE BITARTRATE AND ACETAMINOPHEN 5; 325 MG/1; MG/1
1 TABLET ORAL ONCE
Status: COMPLETED | OUTPATIENT
Start: 2024-10-11 | End: 2024-10-11

## 2024-10-11 RX ORDER — HYDROCODONE BITARTRATE AND ACETAMINOPHEN 5; 325 MG/1; MG/1
1 TABLET ORAL EVERY 6 HOURS PRN
Qty: 12 TABLET | Refills: 0 | Status: SHIPPED | OUTPATIENT
Start: 2024-10-11

## 2024-10-11 RX ORDER — DOCUSATE SODIUM 100 MG/1
100 CAPSULE, LIQUID FILLED ORAL 2 TIMES DAILY PRN
Qty: 60 CAPSULE | Refills: 0 | Status: SHIPPED | OUTPATIENT
Start: 2024-10-11 | End: 2024-11-10

## 2024-10-12 VITALS
HEIGHT: 65 IN | DIASTOLIC BLOOD PRESSURE: 70 MMHG | RESPIRATION RATE: 18 BRPM | HEART RATE: 80 BPM | WEIGHT: 262.38 LBS | SYSTOLIC BLOOD PRESSURE: 142 MMHG | BODY MASS INDEX: 43.71 KG/M2 | OXYGEN SATURATION: 98 % | TEMPERATURE: 97 F

## 2024-10-12 NOTE — ED PROVIDER NOTES
Patient Seen in: [facility] Emergency Department    History     Chief Complaint   Patient presents with    Wrist Injury     Stated Complaint: L wrist injury    HPI    HPI: Rossy Courtney is a 44 year old female hx obesity, prior hx of iron deficiency who presents after an injury to the left wrist that occurred after a fall just prior to arrival while the patient was at a .  Patient states that prior to coming to the ER she was at a  and then fell, injuring the left wrist.  She does not remember slipping or tripping, but states that she felt well and does not really exactly know how she fell though she remembers the fall. Specifically she denies feeling feverish, confused, unwell, lightheaded, dizzy, chest pain, shortness of breath, or losing consciousness at any time nor having any seizure-like activity.  She did not hit her head or pass out.  She was ambulatory at the scene and denies any numbness weakness or tingling, slurred speech or any other symptoms that preceded her fall and or after her fall however states that she tried to break her fall with her left wrist and since had severe pain in the area, worse with moving her fingers or elbow, better at rest.  Denies numbness or tingling in her left arm. She is right handed. Reports severe pain at this time and hasn't taken any pain meds prior to arrival    Past Medical History:    Anesthesia complication    muscular aches and pain that hits hard after a day or so after the porocedure    Colitis    Morbid obesity (HCC)    PCOD (polycystic ovarian disease)    Visual impairment    contacts/glasses       Past Surgical History:   Procedure Laterality Date    Colonoscopy N/A 10/2/2017    Procedure: COLONOSCOPY;  Surgeon: To Alejandre MD;  Location:  ENDOSCOPY    Colonoscopy N/A 2021    Procedure: COLONOSCOPY;  Surgeon: Kimani Parikh MD;  Location: [facility] ENDOSCOPY    Lap gastric bypass/brenda-en-y      Other surgical history       ankle surgery x 3             Family History   Problem Relation Age of Onset    Diabetes Father     Diabetes Mother     Cancer Neg     Heart Disorder Neg        Social History     Socioeconomic History    Marital status:    Tobacco Use    Smoking status: Never    Smokeless tobacco: Never   Vaping Use    Vaping status: Never Used   Substance and Sexual Activity    Alcohol use: Yes     Alcohol/week: 0.0 standard drinks of alcohol     Comment: social    Drug use: No    Sexual activity: Yes     Birth control/protection: Inserts       Review of Systems    Positive for stated complaint: L wrist injury  Other systems are as noted in HPI.  Constitutional and vital signs reviewed.      All other systems reviewed and negative except as noted above.    PSFH elements reviewed from today and agreed except as otherwise stated in HPI.    Physical Exam     ED Triage Vitals [10/11/24 2217]   /83   Pulse 90   Resp 15   Temp 97.3 °F (36.3 °C)   Temp src Temporal   SpO2 98 %   O2 Device        Current:/70   Pulse 80   Temp 97.3 °F (36.3 °C) (Temporal)   Resp 18   Ht 165.1 cm (5' 5\")   Wt 119 kg   SpO2 98%   BMI 43.66 kg/m²         Physical Exam      MENTAL STATUS: Alert, oriented, and cooperative. No focal deficit  HEAD: Atraumatic, normocephalic, no raccoon eyes or martinez sign   NECK: Supple, full range of motion without midline or paraspinal c spine tenderness, no stepoff nor deformity   CV: RRR no MRG, +2 radial and dp pulses bilaterally, no extremity edema  RESP: lungs CTAB no wheezes or crackles   EXTREMITIES: L wrist with diffuse swelling,  tender over radial aspect of the left wrist, no deformity. No TTP to L proximaly forearm, elbow, arm, shoulder or clavicle. Limited ROM of fingers/thumb 2/2 pain though pt able to partially flex/extend L elbow, again full ROM limited 2/2 pain elicited at L wrist on ROM testing.  Tenderness throughout the right upper extremity with the bilateral extremities, pelvis  stable  NEURO:Sensation to touch is intact. Effort on strength exam limited 2/2 pain in L wrist.  Cranial nerves II through XII intact facial droop, sensation tact light touch and symmetric bilateral upper and lower extremities throughout.  Strength is 5/5 bilateral lower extremities in the right upper extremity. Alert and oriented x3.   SKIN: No open wounds, no rashes.  PSYCH: Normal affect. Calm and cooperative.      ED Course   Labs Reviewed - No data to display    [facility]     Radiology:    @No results found.         MDM      Pleasant 44-year-old presents status post fall prior to arrival, states she is not exactly sure how she fell however she denies any syncope, Rumpf is the entire event, and denies any symptoms preceding to or after her fall and has felt well since then, she appears well-perfused and while her strength exam is limited in the left upper extremity secondary to pain in her left wrist she otherwise has no focal neurologic deficits, no pulse deficits, does not appear pale, and is well-appearing, alert and oriented x 3.  I suspect likely mechanical fall that led to patient injuring her left wrist, I have low suspicion at this time for hypoglycemia, anemia, or other cause that could have led to patient falling particularly given lack of any other symptoms and do not feel labs or other imaging is indicated at this time. Will give norco for pain. XR L wrist obtained from triage.       ED Course as of 10/12/24 0027  ------------------------------------------------------------  Time: 10/11 7033  Value: XR WRIST COMPLETE (MIN 3 VIEWS), LEFT (CPT=73110)  Comment: X-RAY LEFT WRIST 3 view      IMPRESSION:  Fracture of the distal radius with intra-articular extension.  No wrist subluxation or dislocation.    ------------------------------------------------------------  Time: 10/12 0027  Comment: Patient feeling better after placement of splint, patient still distally neurovascular intact with range of motion  preserved to her fingers.  Counseled her extensively on strict return precautions and to follow-up with her primary doctor.  Return if new or worsening symptoms occur at any time or if she is having difficulty following up, counseled her she needs to see orthopedics within 7 days.  She is comfortable with the plan for discharge at this time         Disposition and Plan     Clinical Impression:  1. Other closed intra-articular fracture of distal end of left radius, initial encounter    2. Left wrist pain         Disposition:  Discharge  10/12/2024 12:24 am    Follow-up:  Pavithra Leon MD  [address]  [facility] [address]  [phone]    Schedule an appointment as soon as possible for a visit in 2 day(s)      [facility] Emergency Department  [address] [facility] [address]  [phone]  Go to  If symptoms worsen, immediately    Alex Blackburn MD  [address] [place] [address] [address] [place] [address]  [phone]    Schedule an appointment as soon as possible for a visit in 1 week(s)      [facility] [address] [address]  Schedule an appointment as soon as possible for a visit in 1 week(s)            Medications Prescribed:  Current Discharge Medication List        START taking these medications    Details   HYDROcodone-acetaminophen 5-325 MG Oral Tab Take 1 tablet by mouth every 6 (six) hours as needed.  Qty: 12 tablet, Refills: 0      docusate sodium 100 MG Oral Cap Take 1 capsule (100 mg total) by mouth 2 (two) times daily as needed.  Qty: 60 capsule, Refills: 0                 Supplementary Documentation:                                               Disposition and Plan     Clinical Impression:  1. Other closed intra-articular fracture of distal end of left radius, initial encounter    2. Left wrist pain        Disposition:  Discharge    Follow-up:  Pavithra Leon MD  [address]  [facility]  [address]  [phone]    Schedule an appointment as soon as possible for a visit in 2 day(s)      [facility] Emergency Department  [address]  [facility] [id number] [phone]  Go to  If symptoms worsen, immediately    Alex Blackburn MD  [address] [place] [address]  [phone]    Schedule an appointment as soon as possible for a visit in 1 week(s)      [facility] AT [address] [address]  Schedule an appointment as soon as possible for a visit in 1 week(s)        Medications Prescribed:  Current Discharge Medication List        START taking these medications    Details   HYDROcodone-acetaminophen 5-325 MG Oral Tab Take 1 tablet by mouth every 6 (six) hours as needed.  Qty: 12 tablet, Refills: 0      docusate sodium 100 MG Oral Cap Take 1 capsule (100 mg total) by mouth 2 (two) times daily as needed.  Qty: 60 capsule, Refills: 0             Negrita Contreras, DO  Attending Physician  Emergency Medicine

## 2024-10-12 NOTE — DISCHARGE INSTRUCTIONS
Thank you for seeking care at [facility] Emergency Department.    You have been seen in the ED today for a broken bone, known as a fracture.  You are stable to be discharged home, and follow up with orthopedics as an outpatient.     You have been provided a splint in the ED, please keep this on until you see the orthopedic specialist.   Keep your splint clean and dry. If you are showering, keep the splint out of the water, do a sponge bath, or place a bag over the splint and tape/rubber band it to avoid water from getting on it.   Elevated the region of injury if possible to reduce swelling    Take medications as prescribed    Please be sure to call the orthopedic specialist tomorrow to schedule further care in approximately 1 week.     Remember, your care process does not end after your visit today. Please follow-up with your doctor within 1-2 days for a follow-up check to ensure you are  improving, to see if you need any further evaluation/testing, or to evaluate for any alternate diagnoses.    Please return to the emergency department if you develop significant worsening of pain, numbness or tingling of the extremity, discoloration of the skin around the splint, fevers, or if you develop any other new or concerning symptoms as these could be signs of more serious medical illness.    We hope you feel better.

## 2024-10-12 NOTE — ED INITIAL ASSESSMENT (HPI)
Pt reports \"I was at  and I was talking to someone and all of a sudden I was going down. I didn't pass out, my legs didn't feel weak. I didn't hit my head. I was fully aware of what happened. I was wearing wedges. It felt like someone bumped into me but no one did\". Pt c/o left wrist pain. CMS intact.

## (undated) DEVICE — LINE MNTR ADLT SET O2 INTMD

## (undated) DEVICE — 3M™ RED DOT™ MONITORING ELECTRODE WITH FOAM TAPE AND STICKY GEL, 50/BAG, 20/CASE, 72/PLT 2570: Brand: RED DOT™

## (undated) DEVICE — MEDI-VAC NON-CONDUCTIVE SUCTION TUBING 6MM X 1.8M (6FT.) L: Brand: CARDINAL HEALTH

## (undated) DEVICE — CONMED SCOPE SAVER BITE BLOCK, 20X27 MM: Brand: SCOPE SAVER

## (undated) DEVICE — FORCEP RADIAL JAW 4

## (undated) DEVICE — KIT CLEAN ENDOKIT 1.1OZ GOWNX2

## (undated) DEVICE — ENDOSCOPY PACK UPPER: Brand: MEDLINE INDUSTRIES, INC.

## (undated) DEVICE — KIT ENDO ORCAPOD 160/180/190

## (undated) DEVICE — 3 ML SYRINGE LUER-LOCK TIP: Brand: MONOJECT

## (undated) DEVICE — SNARE CAPTIFLEX MICRO-OVL OLY

## (undated) DEVICE — FILTERLINE NASAL ADULT O2/CO2

## (undated) DEVICE — 6 ML SYRINGE LUER-LOCK TIP: Brand: MONOJECT

## (undated) DEVICE — 1200CC GUARDIAN II: Brand: GUARDIAN

## (undated) DEVICE — SNARE OPTMZ PLPCTM TRP

## (undated) DEVICE — Device: Brand: DEFENDO AIR/WATER/SUCTION AND BIOPSY VALVE

## (undated) DEVICE — ENDOSCOPY PACK - LOWER: Brand: MEDLINE INDUSTRIES, INC.

## (undated) NOTE — LETTER
[address] [facility]  Authorization for Invasive Procedures  1.  I hereby authorize Dr. Hayley Philippe , my physician and whomever may be designated as the doctor's assistant, to perform the following operation and/or procedure:  Eso risks that can occur: fever and allergic reactions, hemolytic reactions, transmission of disease such as hepatitis, AIDS, cytomegalovirus (CMV), and flluid overload.  In the event that I wish to have autologous transfusions of my own blood, or a directed do of my physician.      Signature of Patient:  ________________________________________________ Date: _________Time: _________    Responsible person in case of minor or unconscious: _____________________________Relationship: ____________     Witness Signature

## (undated) NOTE — LETTER
10/5/2017          [address]    Dear Graciela Santos,       Here are the biopsy/pathology findings from your recent EGD (upper endoscopy) and colonoscopy:     The biopsy/pathology findings from your upper endoscopy showed:

## (undated) NOTE — LETTER
Date & Time: 10/12/2024, 12:25 AM  Patient: Rossy Courtney  Encounter Provider(s):    Negrita Contreras DO       To Whom It May Concern:    Rossy Courtney was seen and treated in our department on 10/11/2024. She can return to work.  No use of Left arm till cleared by orthopedic MD    If you have any questions or concerns, please do not hesitate to call.        _____________________________  Physician/APC Signature

## (undated) NOTE — LETTER
[address], [place]  Authorization for Invasive Procedures  1. I hereby authorize Dr. Sanam Crowe , my physician and whomever may be designated as the doctor's assistant, to perform the following operation and/or procedure: FLEXIBLE SIGMOIDOSCOPY on [address] at [facility].  2. My physician has explained to me the nature and purpose of the operation or other procedure, possible alternative methods of treatment, the risks involved and the possibility of complications to me. I understand the probable consequences of declining the recommended procedure and the alternative methods of treatment. I acknowledge that no guarantee has been made as to the result that may be obtained. 3. I recognize that during the course of this operation or other procedure, unforeseen conditions may necessitate additional or different procedures than those listed above. I, therefore, further authorize and request that the above-named physician, his/her physician assistants, or designees perform such procedures as are, in his/her professional opinion, necessary and desirable. If I have a Do Not Attempt Resuscitation (DNAR) order in place, that status will be suspended while in the operating room, procedural suite, and during the recovery period unless otherwise explicitly stated by me (or a person authorized to consent on my behalf). The surgeon or my attending physician will determine when the applicable recovery period ends for purposes of reinstating the DNAR order. 4. Should the need arise during my operation or immediate post-operative period; I also consent to the administration of blood and/or blood products.  Further, I understand that despite careful testing and screening of blood and blood products, I may still be subject to ill effects as a result of recieving a blood transfusion an/or blood producst. The following are some, but not all, of the potential risks that can occur: fever and allergic reactions, hemolytic reactions, transmission of disease such as hepatitis, AIDS, cytomegalovirus (CMV), and flluid overload. In the event that I wish to have autologous transfusions of my own blood, or a directed donor transfusion, I will discuss this with my physician. 5. I consent to the photographing of the operations or procedures to be performed for the purposes of advancing medicine, science, and/or education, provided my identity is not revealed. If the procedure has been videotaped, the physician/surgeon will obtain the original videotape. The hospital will not be responsible for storage or maintenance of this tape. 6. I consent to the presence of a  or observer as deemed necessary by my physician or his designee. 7. Any tissues or organs removed in the operation or other procedure may be disposed of by and at the discretion of [facility].    8. I understand that the physician and his/her physician assistants may not be employees or agents of [facility], [facility], nor [facility], but are independent medical practitioners who have been permitted to use its facilities for the care and treatment of their patients. 9. Patients having a sterilization procedure: I understand that if the procedure is successful the results will be permanent and it will therefore be impossible for me to inseminate, conceive or bear children. I also understand that the procedure is intended to result in sterility, although the result has not been guaranteed. 10. I CERTIFY THAT I HAVE READ AND FULLY UNDERSTAND THE ABOVE CONSENT TO OPERATION and/or OTHER PROCEDURE. 11. I acknowledge that my physician has explained sedation/analgesia administration to me including the risks and benefits. I consent to the administration of sedation/analgesia as may be necessary or desirable in the judgment of my physician. Signature of Patient:  ________________________________________________ Date: _________Time: _________    Responsible person in case of minor or unconscious: _____________________________Relationship: ____________     Witness Signature: ____________________________________________ Date: __________ Time: ___________    Statement of Physician  My signature below affirms that prior to the time of the procedure, I have explained to the patient and/or her legal representative, the risks and benefits involved in the proposed treatment and any reasonable alternative to the proposed treatment. I have also explained the risks and benefits involved in the refusal of the proposed treatment and have answered the patient's questions. If I have a significant financial interest in this procedure/surgery, I have disclosed this and had a discussion with my patient.     Signature of Physician:   ________________________________________Date: _________Time:_______ Patient Name: Doretha Marcelo  : 1980   Printed: 2022    Medical Record #: [id number]

## (undated) NOTE — LETTER
Date & Time: 1/16/2022, 11:09 AM  Patient: Lico Persaud  Encounter Provider(s):    Leonel Mccormick MD       To Whom It May Concern:    Nitin Butler was seen and treated in our department on 1/16/2022. She is off work for next 3 days.     If you hav

## (undated) NOTE — LETTER
[facility]  Administration of Anesthesia  1. Narinder Ruiz, or _________________________________ acting on her behalf, (Patient) (Dependent/Representative) request to receive anesthesia for my pending procedure/operation/treatment.   A bleeding, seizure, cardiac arrest and death. 7. AWARENESS: I understand that it is possible (but unlikely) to have explicit memory of events from the operating room while under general anesthesia.   8. ELECTROCONVULSIVE THERAPY PATIENTS: This consent serve below affirms that prior to the time of the procedure, I have explained to the patient and/or his/her guardian, the risks and benefits of undergoing anesthesia, as well as any reasonable alternatives.     ___________________________________________________